# Patient Record
Sex: FEMALE | Race: WHITE | NOT HISPANIC OR LATINO | URBAN - METROPOLITAN AREA
[De-identification: names, ages, dates, MRNs, and addresses within clinical notes are randomized per-mention and may not be internally consistent; named-entity substitution may affect disease eponyms.]

---

## 2024-03-08 ENCOUNTER — OUTPATIENT (OUTPATIENT)
Dept: OUTPATIENT SERVICES | Facility: HOSPITAL | Age: 49
LOS: 1 days | End: 2024-03-08
Payer: COMMERCIAL

## 2024-03-08 VITALS
OXYGEN SATURATION: 99 % | HEIGHT: 69 IN | HEART RATE: 80 BPM | DIASTOLIC BLOOD PRESSURE: 87 MMHG | WEIGHT: 231.04 LBS | TEMPERATURE: 98 F | SYSTOLIC BLOOD PRESSURE: 137 MMHG | RESPIRATION RATE: 16 BRPM

## 2024-03-08 DIAGNOSIS — N20.1 CALCULUS OF URETER: ICD-10-CM

## 2024-03-08 DIAGNOSIS — Z01.818 ENCOUNTER FOR OTHER PREPROCEDURAL EXAMINATION: ICD-10-CM

## 2024-03-08 LAB
ANION GAP SERPL CALC-SCNC: 9 MMOL/L — SIGNIFICANT CHANGE UP (ref 5–17)
BUN SERPL-MCNC: 14 MG/DL — SIGNIFICANT CHANGE UP (ref 7–23)
CALCIUM SERPL-MCNC: 8.9 MG/DL — SIGNIFICANT CHANGE UP (ref 8.4–10.5)
CHLORIDE SERPL-SCNC: 103 MMOL/L — SIGNIFICANT CHANGE UP (ref 96–108)
CO2 SERPL-SCNC: 25 MMOL/L — SIGNIFICANT CHANGE UP (ref 22–31)
CREAT SERPL-MCNC: 0.81 MG/DL — SIGNIFICANT CHANGE UP (ref 0.5–1.3)
EGFR: 90 ML/MIN/1.73M2 — SIGNIFICANT CHANGE UP
GLUCOSE SERPL-MCNC: 91 MG/DL — SIGNIFICANT CHANGE UP (ref 70–99)
HCT VFR BLD CALC: 41.5 % — SIGNIFICANT CHANGE UP (ref 34.5–45)
HGB BLD-MCNC: 13.3 G/DL — SIGNIFICANT CHANGE UP (ref 11.5–15.5)
MCHC RBC-ENTMCNC: 29.8 PG — SIGNIFICANT CHANGE UP (ref 27–34)
MCHC RBC-ENTMCNC: 32 GM/DL — SIGNIFICANT CHANGE UP (ref 32–36)
MCV RBC AUTO: 93 FL — SIGNIFICANT CHANGE UP (ref 80–100)
NRBC # BLD: 0 /100 WBCS — SIGNIFICANT CHANGE UP (ref 0–0)
PLATELET # BLD AUTO: 251 K/UL — SIGNIFICANT CHANGE UP (ref 150–400)
POTASSIUM SERPL-MCNC: 4.3 MMOL/L — SIGNIFICANT CHANGE UP (ref 3.5–5.3)
POTASSIUM SERPL-SCNC: 4.3 MMOL/L — SIGNIFICANT CHANGE UP (ref 3.5–5.3)
RBC # BLD: 4.46 M/UL — SIGNIFICANT CHANGE UP (ref 3.8–5.2)
RBC # FLD: 13 % — SIGNIFICANT CHANGE UP (ref 10.3–14.5)
SODIUM SERPL-SCNC: 137 MMOL/L — SIGNIFICANT CHANGE UP (ref 135–145)
WBC # BLD: 6.48 K/UL — SIGNIFICANT CHANGE UP (ref 3.8–10.5)
WBC # FLD AUTO: 6.48 K/UL — SIGNIFICANT CHANGE UP (ref 3.8–10.5)

## 2024-03-08 NOTE — H&P PST ADULT - PRIMARY CARE PROVIDER
[facility]bry Pregnancy And Lactation Text: It is unknown if this medication will adversely affect pregnancy or breast feeding. Dr. Vasu uDarte

## 2024-03-08 NOTE — H&P PST ADULT - NSICDXPASTMEDICALHX_GEN_ALL_CORE_FT
PAST MEDICAL HISTORY:  Calculus of ureter     Fetal demise 20 weeks gestation    Listeria food poisoning 2005    Miscarriage 2001    Protein in urine 24 hour urine Feb 2015     PAST MEDICAL HISTORY:  Calculus of ureter     COVID-19 virus infection     Fetal demise 20 weeks gestation    Listeria food poisoning 2005    Miscarriage 2001    Protein in urine 24 hour urine Feb 2015

## 2024-03-08 NOTE — H&P PST ADULT - HISTORY OF PRESENT ILLNESS
49 yo female, PMH 47 yo female, PMH  presents to [facility] [place] for scheduled Cystoscopy, Left Ureteroscopy, Soltive Laser, Stent Insertion with Fluoroscopy, Possible Ureterocele Incision on 3/26/24. Denies recent fever, chills, chest pain, SOB, changes in bowel/urinary habits or recent exposure to COVID-19 infection.   cyst in the bladder,  h/o kidney   10 years ago, 12 years ago, lithotripsy,   sonogra,  back pain  covid 1/9/24 -  47 yo female, PMH bladder cyst (benign), left duplex kidney, nephrolithiasis s/p lithotripsy 2012 and 2014. Pt. reports having mild back pain recently, sonogram revealed left ureteral stone and presents to [facility] for scheduled Cystoscopy, Left Ureteroscopy, Soltive Laser, Stent Insertion with Fluoroscopy, Possible Ureterocele Incision on 3/26/24. Denies recent fever, chills, chest pain, SOB, changes in bowel/urinary habits or recent exposure to COVID-19 infection. (pt had Covid-19 infection in January 2024 with mild-mod symptoms - no treatment)

## 2024-03-08 NOTE — H&P PST ADULT - NEGATIVE GENERAL GENITOURINARY SYMPTOMS
no hematuria/no bladder infections/no dysuria/normal urinary frequency no hematuria/no bladder infections/no dysuria mild low back pain/no hematuria/no bladder infections/no dysuria

## 2024-03-08 NOTE — H&P PST ADULT - MUSCULOSKELETAL
no calf tenderness/normal gait/strength 5/5 bilateral upper extremities/strength 5/5 bilateral lower extremities details…

## 2024-03-08 NOTE — H&P PST ADULT - ASSESSMENT
Activity:    DASI scale:    Mallampati:    Dentition: Activity: Can walk >5 blocks, 2 flights of stairs, all house chores    DASI scale:     Mallampati: 2    Dentition: Denies loose teeth, dentures, sees dentist yearly

## 2024-03-08 NOTE — H&P PST ADULT - PROBLEM SELECTOR PLAN 1
Cystoscopy, Left Ureteroscopy, Soltive Laser, Stent Insertion with Fluoroscopy, Possible Ureterocele Incision on 3/26/24. Cystoscopy, Left Ureteroscopy, Soltive Laser, Stent Insertion with Fluoroscopy, Possible Ureterocele Incision on 3/26/24.  Pre-op instructions provided - all questions answered  Labs done at [facility]  [facility]

## 2024-03-08 NOTE — H&P PST ADULT - NSANTHOSAYNRD_GEN_A_CORE
No. CRISTAL screening performed.  STOP BANG Legend: 0-2 = LOW Risk; 3-4 = INTERMEDIATE Risk; 5-8 = HIGH Risk

## 2024-03-08 NOTE — H&P PST ADULT - NSICDXPASTSURGICALHX_GEN_ALL_CORE_FT
PAST SURGICAL HISTORY:  History of  section twin pregnancy fetal demise 25 weeks gestation    History of miscarriage   [facility]

## 2024-03-08 NOTE — H&P PST ADULT - ATTENDING COMMENTS
Female with partially duplicated left collecting system with distal ureteral stone in upper pole moiety ureter, stone in bladder/UVJ/ureterocele. OR today for left ureteroscopy, laser lithotripsy, retrograde pyelogram, possible ureterocele incision, stent(s) insertion. R/B/A reviewed. Imaging reviewed. Consent reviewed and signed. Marked on left.    Kyle Cazares MD  [facility]  [address] [facility], [place] [address]  Office: [phone] Fax: [phone]

## 2024-03-09 LAB
CULTURE RESULTS: SIGNIFICANT CHANGE UP
SPECIMEN SOURCE: SIGNIFICANT CHANGE UP

## 2024-03-25 ENCOUNTER — TRANSCRIPTION ENCOUNTER (OUTPATIENT)
Age: 49
End: 2024-03-25

## 2024-03-26 ENCOUNTER — TRANSCRIPTION ENCOUNTER (OUTPATIENT)
Age: 49
End: 2024-03-26

## 2024-03-26 ENCOUNTER — OUTPATIENT (OUTPATIENT)
Dept: INPATIENT UNIT | Facility: HOSPITAL | Age: 49
LOS: 1 days | End: 2024-03-26
Payer: COMMERCIAL

## 2024-03-26 VITALS
DIASTOLIC BLOOD PRESSURE: 71 MMHG | RESPIRATION RATE: 15 BRPM | HEART RATE: 74 BPM | OXYGEN SATURATION: 99 % | TEMPERATURE: 98 F | SYSTOLIC BLOOD PRESSURE: 115 MMHG

## 2024-03-26 VITALS
RESPIRATION RATE: 16 BRPM | DIASTOLIC BLOOD PRESSURE: 80 MMHG | SYSTOLIC BLOOD PRESSURE: 131 MMHG | TEMPERATURE: 99 F | HEART RATE: 88 BPM | OXYGEN SATURATION: 96 %

## 2024-03-26 DIAGNOSIS — N20.1 CALCULUS OF URETER: ICD-10-CM

## 2024-03-26 LAB — HCG UR QL: NEGATIVE — SIGNIFICANT CHANGE UP

## 2024-03-26 PROCEDURE — C2617: CPT

## 2024-03-26 PROCEDURE — C1758: CPT

## 2024-03-26 PROCEDURE — 87086 URINE CULTURE/COLONY COUNT: CPT

## 2024-03-26 PROCEDURE — 80048 BASIC METABOLIC PNL TOTAL CA: CPT

## 2024-03-26 PROCEDURE — G0463: CPT

## 2024-03-26 PROCEDURE — 52332 CYSTOSCOPY AND TREATMENT: CPT | Mod: LT

## 2024-03-26 PROCEDURE — C1769: CPT

## 2024-03-26 PROCEDURE — 81025 URINE PREGNANCY TEST: CPT

## 2024-03-26 PROCEDURE — 76000 FLUOROSCOPY <1 HR PHYS/QHP: CPT

## 2024-03-26 PROCEDURE — 85027 COMPLETE CBC AUTOMATED: CPT

## 2024-03-26 DEVICE — STENT URET INLAY OPTIMA 6FRX26CM: Type: IMPLANTABLE DEVICE | Site: LEFT | Status: FUNCTIONAL

## 2024-03-26 DEVICE — GUIDEWIRE SENSOR DUAL-FLEX NITINOL STRAIGHT .035" X 150CM: Type: IMPLANTABLE DEVICE | Site: LEFT | Status: FUNCTIONAL

## 2024-03-26 DEVICE — URETERAL CATH OPEN END 5FR 70CM: Type: IMPLANTABLE DEVICE | Site: LEFT | Status: FUNCTIONAL

## 2024-03-26 RX ORDER — PHENAZOPYRIDINE HCL 100 MG
200 TABLET ORAL ONCE
Refills: 0 | Status: COMPLETED | OUTPATIENT
Start: 2024-03-26 | End: 2024-03-26

## 2024-03-26 RX ORDER — ONDANSETRON 8 MG/1
4 TABLET, FILM COATED ORAL ONCE
Refills: 0 | Status: DISCONTINUED | OUTPATIENT
Start: 2024-03-26 | End: 2024-03-26

## 2024-03-26 RX ORDER — HYDROMORPHONE HYDROCHLORIDE 2 MG/ML
0.5 INJECTION INTRAMUSCULAR; INTRAVENOUS; SUBCUTANEOUS
Refills: 0 | Status: DISCONTINUED | OUTPATIENT
Start: 2024-03-26 | End: 2024-03-26

## 2024-03-26 RX ORDER — OXYBUTYNIN CHLORIDE 5 MG
5 TABLET ORAL ONCE
Refills: 0 | Status: COMPLETED | OUTPATIENT
Start: 2024-03-26 | End: 2024-03-26

## 2024-03-26 RX ORDER — KETOROLAC TROMETHAMINE 30 MG/ML
15 SYRINGE (ML) INJECTION ONCE
Refills: 0 | Status: DISCONTINUED | OUTPATIENT
Start: 2024-03-26 | End: 2024-03-26

## 2024-03-26 RX ORDER — SODIUM CHLORIDE 9 MG/ML
1000 INJECTION, SOLUTION INTRAVENOUS
Refills: 0 | Status: DISCONTINUED | OUTPATIENT
Start: 2024-03-26 | End: 2024-03-26

## 2024-03-26 RX ORDER — LIDOCAINE HCL 20 MG/ML
0.2 VIAL (ML) INJECTION ONCE
Refills: 0 | Status: DISCONTINUED | OUTPATIENT
Start: 2024-03-26 | End: 2024-03-26

## 2024-03-26 RX ORDER — CEFAZOLIN SODIUM 1 G
2000 VIAL (EA) INJECTION ONCE
Refills: 0 | Status: COMPLETED | OUTPATIENT
Start: 2024-03-26 | End: 2024-03-26

## 2024-03-26 RX ORDER — SODIUM CHLORIDE 9 MG/ML
3 INJECTION INTRAMUSCULAR; INTRAVENOUS; SUBCUTANEOUS EVERY 8 HOURS
Refills: 0 | Status: DISCONTINUED | OUTPATIENT
Start: 2024-03-26 | End: 2024-03-26

## 2024-03-26 RX ADMIN — Medication 5 MILLIGRAM(S): at 15:04

## 2024-03-26 RX ADMIN — Medication 200 MILLIGRAM(S): at 15:04

## 2024-03-26 RX ADMIN — Medication 15 MILLIGRAM(S): at 15:04

## 2024-03-26 NOTE — ASU DISCHARGE PLAN (ADULT/PEDIATRIC) - NS MD DC FALL RISK RISK
For information on Fall & Injury Prevention, visit: [email or web address] OR  [email or web address] OR  https://www.cdc.gov/steadi/patient.html

## 2024-03-26 NOTE — PRE-ANESTHESIA EVALUATION ADULT - NSANTHSNORERD_ENT_A_CORE
Yes Diet, Regular:   1500mL Fluid Restriction ([id number]) (04-15-23)   Diet, Regular:   1500mL Fluid Restriction ([id number]) (04-15-23)   Diet, Regular:   1500mL Fluid Restriction ([id number]) (04-15-23)

## 2024-03-26 NOTE — BRIEF OPERATIVE NOTE - OPERATION/FINDINGS
lower pole moiety ureter easily accessed and orifice in orthotopic position - upper pole moiety ureter emptied into ureterocele - access unable to be obtained

## 2024-03-26 NOTE — ASU DISCHARGE PLAN (ADULT/PEDIATRIC) - CARE PROVIDER_API CALL
peritoneal fluid Kyle Cazares  Urology  [address], [address]  [place] [address]  Phone: [phone]  Fax: [phone]  Established Patient  Follow Up Time: 1 week

## 2024-03-26 NOTE — ASU PATIENT PROFILE, ADULT - FALL HARM RISK - CONCLUSION
Pt having occasional pauses. Longest one has been 3.08 seconds. Dr. Cintia Tena notfied via perfect serve. Pt's VSS and she is asymptomatic. Cardiology consulted call at [phone] but team has not seen patient yet. Per Dr. Cintia Tena continue to monitor overnight, but if pauses become greater than 5 seconds call cardiology and notify. Universal Safety Interventions

## 2024-03-26 NOTE — BRIEF OPERATIVE NOTE - NSICDXBRIEFPOSTOP_GEN_ALL_CORE_FT
POST-OP DIAGNOSIS:  Left ureteral stone 26-Mar-2024 13:30:39  Kyle Cazares  Duplicated left renal collecting system 26-Mar-2024 13:30:48  Kyle Cazares  Ureterocele 26-Mar-2024 13:31:00  Kyle Cazares

## 2024-03-26 NOTE — BRIEF OPERATIVE NOTE - NSICDXBRIEFPROCEDURE_GEN_ALL_CORE_FT
PROCEDURES:  Cystoscopy with left retrograde pyelography with ureteroscopy with insertion of ureteral stent 26-Mar-2024 13:29:35  Kyle Cazares

## 2024-03-26 NOTE — ASU DISCHARGE PLAN (ADULT/PEDIATRIC) - ASU DC SPECIAL INSTRUCTIONSFT
You have been sent three days of antibiotics to begin tonight.  I will contact you regarding next steps.

## 2024-03-26 NOTE — ASU DISCHARGE PLAN (ADULT/PEDIATRIC) - NURSING INSTRUCTIONS
azo over the counter for burning when urinating azo over the counter for burning when urinating  antibiotics were sent from DR medina office, please call office if you did not receive them

## 2024-04-04 PROBLEM — U07.1 COVID-19: Chronic | Status: ACTIVE | Noted: 2024-03-08

## 2024-04-08 PROBLEM — R93.89 ABNORMAL FINDING ON IMAGING: Status: ACTIVE | Noted: 2024-04-08

## 2024-04-08 PROBLEM — N20.0 CALCULUS OF LEFT KIDNEY: Status: ACTIVE | Noted: 2024-04-08

## 2024-04-08 PROBLEM — K80.20 CALCULUS OF GALLBLADDER WITHOUT CHOLECYSTITIS WITHOUT OBSTRUCTION: Status: ACTIVE | Noted: 2024-04-08

## 2024-04-08 PROBLEM — F41.9 ANXIETY: Status: ACTIVE | Noted: 2024-04-08

## 2024-04-09 ENCOUNTER — APPOINTMENT (OUTPATIENT)
Dept: INTERVENTIONAL RADIOLOGY/VASCULAR | Facility: CLINIC | Age: 49
End: 2024-04-09
Payer: COMMERCIAL

## 2024-04-09 DIAGNOSIS — Z01.818 ENCOUNTER FOR OTHER PREPROCEDURAL EXAMINATION: ICD-10-CM

## 2024-04-09 DIAGNOSIS — K80.20 CALCULUS OF GALLBLADDER W/OUT CHOLECYSTITIS W/OUT OBSTRUCTION: ICD-10-CM

## 2024-04-09 DIAGNOSIS — R93.89 ABNORMAL FINDINGS ON DIAGNOSTIC IMAGING OF OTHER SPECIFIED BODY STRUCTURES: ICD-10-CM

## 2024-04-09 DIAGNOSIS — Q63.8 OTHER SPECIFIED CONGENITAL MALFORMATIONS OF KIDNEY: ICD-10-CM

## 2024-04-09 DIAGNOSIS — R01.1 CARDIAC MURMUR, UNSPECIFIED: ICD-10-CM

## 2024-04-09 DIAGNOSIS — Z80.3 FAMILY HISTORY OF MALIGNANT NEOPLASM OF BREAST: ICD-10-CM

## 2024-04-09 DIAGNOSIS — F41.9 ANXIETY DISORDER, UNSPECIFIED: ICD-10-CM

## 2024-04-09 DIAGNOSIS — N20.0 CALCULUS OF KIDNEY: ICD-10-CM

## 2024-04-09 DIAGNOSIS — Z78.9 OTHER SPECIFIED HEALTH STATUS: ICD-10-CM

## 2024-04-09 PROCEDURE — 99203 OFFICE O/P NEW LOW 30 MIN: CPT

## 2024-04-17 PROBLEM — N20.1 CALCULUS OF URETER: Chronic | Status: ACTIVE | Noted: 2024-03-08

## 2024-04-24 ENCOUNTER — OUTPATIENT (OUTPATIENT)
Dept: OUTPATIENT SERVICES | Facility: HOSPITAL | Age: 49
LOS: 1 days | End: 2024-04-24
Payer: COMMERCIAL

## 2024-04-24 VITALS
SYSTOLIC BLOOD PRESSURE: 120 MMHG | DIASTOLIC BLOOD PRESSURE: 80 MMHG | TEMPERATURE: 98 F | WEIGHT: 229.94 LBS | HEART RATE: 68 BPM | RESPIRATION RATE: 18 BRPM | OXYGEN SATURATION: 98 % | HEIGHT: 69 IN

## 2024-04-24 DIAGNOSIS — N20.1 CALCULUS OF URETER: ICD-10-CM

## 2024-04-24 DIAGNOSIS — Z01.818 ENCOUNTER FOR OTHER PREPROCEDURAL EXAMINATION: ICD-10-CM

## 2024-04-24 LAB
ANION GAP SERPL CALC-SCNC: 12 MMOL/L — SIGNIFICANT CHANGE UP (ref 5–17)
BUN SERPL-MCNC: 10 MG/DL — SIGNIFICANT CHANGE UP (ref 7–23)
CALCIUM SERPL-MCNC: 9 MG/DL — SIGNIFICANT CHANGE UP (ref 8.4–10.5)
CHLORIDE SERPL-SCNC: 104 MMOL/L — SIGNIFICANT CHANGE UP (ref 96–108)
CO2 SERPL-SCNC: 24 MMOL/L — SIGNIFICANT CHANGE UP (ref 22–31)
CREAT SERPL-MCNC: 0.78 MG/DL — SIGNIFICANT CHANGE UP (ref 0.5–1.3)
EGFR: 94 ML/MIN/1.73M2 — SIGNIFICANT CHANGE UP
GLUCOSE SERPL-MCNC: 90 MG/DL — SIGNIFICANT CHANGE UP (ref 70–99)
HCT VFR BLD CALC: 39.9 % — SIGNIFICANT CHANGE UP (ref 34.5–45)
HGB BLD-MCNC: 13.1 G/DL — SIGNIFICANT CHANGE UP (ref 11.5–15.5)
MCHC RBC-ENTMCNC: 30.2 PG — SIGNIFICANT CHANGE UP (ref 27–34)
MCHC RBC-ENTMCNC: 32.8 GM/DL — SIGNIFICANT CHANGE UP (ref 32–36)
MCV RBC AUTO: 91.9 FL — SIGNIFICANT CHANGE UP (ref 80–100)
NRBC # BLD: 0 /100 WBCS — SIGNIFICANT CHANGE UP (ref 0–0)
PLATELET # BLD AUTO: 227 K/UL — SIGNIFICANT CHANGE UP (ref 150–400)
POTASSIUM SERPL-MCNC: 4.1 MMOL/L — SIGNIFICANT CHANGE UP (ref 3.5–5.3)
POTASSIUM SERPL-SCNC: 4.1 MMOL/L — SIGNIFICANT CHANGE UP (ref 3.5–5.3)
RBC # BLD: 4.34 M/UL — SIGNIFICANT CHANGE UP (ref 3.8–5.2)
RBC # FLD: 12.9 % — SIGNIFICANT CHANGE UP (ref 10.3–14.5)
SODIUM SERPL-SCNC: 140 MMOL/L — SIGNIFICANT CHANGE UP (ref 135–145)
WBC # BLD: 7.38 K/UL — SIGNIFICANT CHANGE UP (ref 3.8–10.5)
WBC # FLD AUTO: 7.38 K/UL — SIGNIFICANT CHANGE UP (ref 3.8–10.5)

## 2024-04-24 PROCEDURE — G0463: CPT

## 2024-04-24 PROCEDURE — 80048 BASIC METABOLIC PNL TOTAL CA: CPT

## 2024-04-24 PROCEDURE — 85027 COMPLETE CBC AUTOMATED: CPT

## 2024-04-24 PROCEDURE — 87086 URINE CULTURE/COLONY COUNT: CPT

## 2024-04-24 RX ORDER — ERGOCALCIFEROL 1.25 MG/1
1 CAPSULE ORAL
Refills: 0 | DISCHARGE

## 2024-04-24 NOTE — H&P PST ADULT - HISTORY OF PRESENT ILLNESS
47 yo female with PMHx significant for Bladder cyst (benign), left duplex kidney, nephrolithiasis, s/p lithotripsy 2012 and 2014, s/p Cystoscopy with left retrograde pyelography with ureteroscopy with insertion of ureteral stent (3/26/24) who reports intermittent left flank pain, nocturia, and increased urinary frequency. She is now scheduled for Cystoscopy, LEFT Ureteroscopy, Soltive Laser, LEFT Stent Exchange, Transurethral Incision of Ureterocele w/ Bipolar on 5/6/24. She denies CP, SOB, palps, fever, chills.  47 y/o pleasant female with PMHx significant for Bladder cyst (benign), left duplex kidney, nephrolithiasis, s/p lithotripsy 2012 and 2014, s/p Cystoscopy with left retrograde pyelography with ureteroscopy with insertion of ureteral stent (3/26/24) who reports intermittent left flank pain, nocturia, and increased urinary frequency. She is now scheduled for Cystoscopy, LEFT Ureteroscopy, Soltive Laser, LEFT Stent Exchange, Transurethral Incision of Ureterocele w/ Bipolar on 5/6/24. She denies CP, SOB, palps, fever, chills.

## 2024-04-24 NOTE — H&P PST ADULT - ASSESSMENT
DASI score: 6.7 mets   DASI activity: works as school nurse, gardening, carry groceries  Loose teeth or denture: no     MP 1

## 2024-04-24 NOTE — H&P PST ADULT - NSICDXPASTSURGICALHX_GEN_ALL_CORE_FT
PAST SURGICAL HISTORY:  History of  section twin pregnancy fetal demise 25 weeks gestation    History of miscarriage   [facility]

## 2024-04-24 NOTE — H&P PST ADULT - NSICDXPASTMEDICALHX_GEN_ALL_CORE_FT
PAST MEDICAL HISTORY:  Calculus of ureter     COVID-19 virus infection     Fetal demise 20 weeks gestation    Listeria food poisoning 2005    Miscarriage 2001    Protein in urine 24 hour urine Feb 2015

## 2024-04-24 NOTE — H&P PST ADULT - GENERAL
Per chart, patient has not had influenza vaccine this season.    Called and updated that patient has not received flu vaccine this season.    Per assisted living, patient needs flu vaccine.    Scheduled nurse-only visit. No further questions or concerns at this time.    Future Appointments   Date Time Provider Department Center   1/25/2021  2:30 PM [facility] IM NURSE SASHA 230 [facility]   3/11/2021  8:30 AM [facility] ACL LAB [facility]   3/18/2021  3:30 PM Socorro Tovar MD [facility]      negative

## 2024-04-24 NOTE — H&P PST ADULT - PROBLEM SELECTOR PLAN 1
Cystoscopy, LEFT Ureteroscopy, Soltive Laser, LEFT Stent Exchange, Transurethral Incision of Ureterocele w/ Bipolar on 5/6/24.

## 2024-04-24 NOTE — H&P PST ADULT - ATTENDING COMMENTS
Left upper pole moiety distal ureteral stone, left ureterocele with stone in it, s/p left lower pole moiety ureteral stent insertion, left upper pole moiety nephroureteral tube with curl in ureterocele.  OR today for transurethral resection of left ureterocele, left ureteroscopy, laser lithotripsy, stent removal, nephroureteral tube removal, stent insertion. R/B/A reviewed. Imaging reviewed. Consent reviewed and signed. Marked on left.    Kyle Cazares MD  [facility]  [address], [facility] [address], [place] [address]  Office: [phone] Fax: [phone]

## 2024-04-25 LAB
CULTURE RESULTS: SIGNIFICANT CHANGE UP
SPECIMEN SOURCE: SIGNIFICANT CHANGE UP

## 2024-05-01 NOTE — HISTORY OF PRESENT ILLNESS
[FreeTextEntry1] : This is a 49 y/o female with hx of heart murmur, anxiety, bladder cyst (benign), asymptomatic cholelithiasis, left duplex kidney, multiple left renal calculi demonstrated on 2/8/24 CT urogram, who presents to IR for left nephroureteral catheter placement consultation, as referred by Dr Cazares prior to planned renal stone removal.  Ms Toussaint reports she has a hx of kidney stones and had shock wave lithotripsy about 9-10 years ago. She reports she recently  developed cloudy urine, frequency and pain on urination which prompted visit to urologist. She had imaging completed which revealed an obstructing left renal stone. She had a cystoscopy/ureteroscopy on 3/26 with urology however access to the ureteral orifice to the upper pole moiety ureter was unsuccessful. She is now referred to IR by Dr. Cazares for left left nephroureteral catheter placement prior to her planned urological surgery.  Today she reports feeling well. Still with cloudy urine, frequency and pain on urination. Denies any fevers, hematuria or flank pain.   Of note pt reports has a heart murmur and was previously told it was exacerbated by anesthesia. Being monitored by pcp. She has appt with pcp tomorrow 4/10 and will discuss clearance.  Denies nsaids or a/c usage.  ureteroscopy and transurethral resection or incision of the ureterocele on 4/15      Surg: Dinorah Cazares  Dr Bai PCP Dr Vasu Escalante [phone]  dinorah cazares

## 2024-05-01 NOTE — REASON FOR VISIT
[Consultation] : a consultation visit [Home] : at home, [unfilled] , at the time of the visit. [Other Location: e.g. Home (Enter Location, City,State)___] : at [unfilled] [Patient] : the patient [Self] : self [FreeTextEntry1] : left nephroureteral catheter placement

## 2024-05-01 NOTE — ADDENDUM
[FreeTextEntry1] : 5/1/24- received & reviewed medical clearance from Dr Duarte & cardiac clearance from Dr Ritter along with cardiac tests. Tasked [facility] staff to place clearances & cardiac testing on pt's procedure chart JOSH BLANC  4/10/24:  - 4/12 procedure cxd as pt requires cardiac clearance before medical clearance per d/w Dr Escalante - his office notifying her  - spoke to pt re above - she has cardiac eval 4/17 @ 9:30 am - Dr name unknown - pst to be sched after 4/17 - Dr Cazares apprised & has cxd  sx on 4/15 until pt is cleared - [facility] office apprised of the above & SS order has been updated  Ms. MICHELE's comprehension was confirmed & all questions were asked & answered to her satisfaction. IR contact information was reviewed with the pt should there be any questions, issues, or concerns, to be addressed. JOSH BLANC

## 2024-05-01 NOTE — ASSESSMENT
[Other: _____] : [unfilled] [FreeTextEntry1] : This is a 49 y/o female with hx of heart murmur, anxiety, bladder cyst (benign), asymptomatic cholelithiasis, left duplex kidney, multiple left renal calculi demonstrated on 2/8/24 CT urogram, who presents to IR for left nephroureteral catheter placement consultation, as referred by Dr Cazares prior to planned renal stone removal.  1. Left UVJ Calculi - pt with hx of left duplex kidney and nephrolithiasis s/p lithotripsy 9-10 years ago - recently with cloudy urine, dysuria and frequency - demonstrated on 2/8/24 CT urogram with bifid left ureter which join to form a single ureter at the level of UVJ. Left UVJ calculus measuring 6mm; additional 4mm calculus at the left distal ureter - imaging was reviewed at great length with Ms. MICHELE - referred by Dr Bai for image guided left nephroureteral catheter placement - procedural benefits, alternatives, risks (infection, bleeding, MARGO etc) & expected postprocedure course were reviewed, as well - home care for catheter management to be arranged by referring Dr Bai - pt reports had PST and labwork done at [facility] but does not have contact to obtain-is going to her pcp tomorrow 4/10 and will obtain labs/pst visit to fax over - advised pt should she be unable to obtain labs, i have order CBC, BMP, PT/INR and she can go to any [facility] lab tomorrow 4/10 to obtain labs given her procedure is scheduled for 4/12 - will be admitted overnight - continue with consistent follow ups with Dr. Cazares and Dr. Bai  2. Heart murmur -pt reports hx of heart murmur unsure of which kind - reports is asymptomatic however was previously told it was exacerbated by anesthesia - monitored by her pcp - has visit to pcp tomorrow 4/10 for clearance-advised pt to have pcp fax clearance to IR for procedure  I have provided the patient the opportunity to ask questions and have answered them to their satisfaction.  They are encouraged to contact our office with any further questions, concerns, or issues.

## 2024-05-08 ENCOUNTER — TRANSCRIPTION ENCOUNTER (OUTPATIENT)
Age: 49
End: 2024-05-08

## 2024-05-08 ENCOUNTER — INPATIENT (INPATIENT)
Facility: HOSPITAL | Age: 49
LOS: 0 days | Discharge: HOME HEALTH | DRG: 307 | End: 2024-05-09
Attending: STUDENT IN AN ORGANIZED HEALTH CARE EDUCATION/TRAINING PROGRAM | Admitting: RADIOLOGY
Payer: COMMERCIAL

## 2024-05-08 VITALS
WEIGHT: 240.08 LBS | SYSTOLIC BLOOD PRESSURE: 125 MMHG | DIASTOLIC BLOOD PRESSURE: 73 MMHG | HEIGHT: 69 IN | HEART RATE: 71 BPM | OXYGEN SATURATION: 95 % | RESPIRATION RATE: 17 BRPM | TEMPERATURE: 99 F

## 2024-05-08 DIAGNOSIS — N20.0 CALCULUS OF KIDNEY: ICD-10-CM

## 2024-05-08 DIAGNOSIS — R01.0 BENIGN AND INNOCENT CARDIAC MURMURS: ICD-10-CM

## 2024-05-08 LAB
APTT BLD: 38.2 SEC — HIGH (ref 24.5–35.6)
INR BLD: 1.05 RATIO — SIGNIFICANT CHANGE UP (ref 0.85–1.18)
PROTHROM AB SERPL-ACNC: 11 SEC — SIGNIFICANT CHANGE UP (ref 9.5–13)

## 2024-05-08 PROCEDURE — 99222 1ST HOSP IP/OBS MODERATE 55: CPT

## 2024-05-08 PROCEDURE — 50433 PLMT NEPHROURETERAL CATHETER: CPT | Mod: LT

## 2024-05-08 RX ORDER — INFLUENZA VIRUS VACCINE 15; 15; 15; 15 UG/.5ML; UG/.5ML; UG/.5ML; UG/.5ML
0.5 SUSPENSION INTRAMUSCULAR ONCE
Refills: 0 | Status: DISCONTINUED | OUTPATIENT
Start: 2024-05-08 | End: 2024-05-09

## 2024-05-08 RX ORDER — OXYCODONE HYDROCHLORIDE 5 MG/1
5 TABLET ORAL ONCE
Refills: 0 | Status: DISCONTINUED | OUTPATIENT
Start: 2024-05-08 | End: 2024-05-09

## 2024-05-08 RX ORDER — HYDROMORPHONE HYDROCHLORIDE 2 MG/ML
0.25 INJECTION INTRAMUSCULAR; INTRAVENOUS; SUBCUTANEOUS
Refills: 0 | Status: DISCONTINUED | OUTPATIENT
Start: 2024-05-08 | End: 2024-05-09

## 2024-05-08 RX ORDER — ACETAMINOPHEN 500 MG
650 TABLET ORAL EVERY 6 HOURS
Refills: 0 | Status: DISCONTINUED | OUTPATIENT
Start: 2024-05-08 | End: 2024-05-09

## 2024-05-08 RX ORDER — ONDANSETRON 8 MG/1
4 TABLET, FILM COATED ORAL ONCE
Refills: 0 | Status: DISCONTINUED | OUTPATIENT
Start: 2024-05-08 | End: 2024-05-09

## 2024-05-08 NOTE — PATIENT PROFILE ADULT - MEDICATIONS/VISITS
Pre-Operative Progress Note


H&P Reviewed


The H&P was reviewed, patient examined and no changes noted.


Date H&P Reviewed:  Sep 25, 2020


Time H&P Reviewed:  07:56





Conscious Sedation Pre-Proced


ASA Score


2


For ASA 3 and 4: Consider anesthesia and medical clearance. Also, for patients

with a history of failed moderate sedation consider anesthesia.

















Airway 


 


Lungs 


 


Heart 


 


 ASA score


 


 ASA 1: a normal healthy patient


 


 ASA 2:  a patient with a mild systemic disease (mid diabetes, controlled 

hypertension, obesity 


 


 ASA 3:  a patient with a severe systemic disease that limits activity  (angina,

COPD, prior Myocardial infarction)


 


 ASA 4:  a patient with an incapacitating disease that is a constant threat to 

life (CHF, renal failure)


 


 ASA 5:  a moribund patient not expected to survive 24 hrs.  (ruptured aneurysm)


 


 ASA 6:  a declared brain-dead patient whose organs are being harvested.


 


 For emergent operations, add the letter E after the classification











Mallampati Classification


Grade 2





Sedation Plan


Analgesia, Amnesia, Plan communicated to team members, Discussed options with 

patient/fam, Discussed risks with patient/fam


The patient is an appropriate candidate to undergo the planned procedure, 

sedation, and anesthesia.





The patient immediately re-assessed prior to indication.











NEELA RUANO MD              Sep 25, 2020 07:56 no

## 2024-05-08 NOTE — H&P ADULT - NSICDXPASTSURGICALHX_GEN_ALL_CORE_FT
PAST SURGICAL HISTORY:  History of  section twin pregnancy fetal demise 25 weeks gestation    History of miscarriage   [facility]

## 2024-05-08 NOTE — DISCHARGE NOTE PROVIDER - PROVIDER TOKENS
PROVIDER:[TOKEN:[[id number]],SCHEDULEDAPPT:[05/13/2024]] PROVIDER:[TOKEN:[[id number]],SCHEDULEDAPPT:[05/13/2024]],PROVIDER:[TOKEN:[[id number]]]

## 2024-05-08 NOTE — PROCEDURE NOTE - PROCEDURE FINDINGS AND DETAILS
access into left upper pole moiety    antegrade ureterogram demonstrates dilated ureterocele at UVJ of left upper pole moiety, clearance via diminuitive UVJ into bladder on delay images, unable to cross to bladder with wire.     8.5 f x 28cm nephroureteral tube distal pigtail within the ureterocele, proximal pigtail unformed, sidehole marker at the level of the proximal ureter.

## 2024-05-08 NOTE — PROCEDURE NOTE - PLAN
bedrest 1h then floor,     admit to medicine overnight for monitoring  followup with urology for further stone/ureterocele management

## 2024-05-08 NOTE — H&P ADULT - PROBLEM SELECTOR PLAN 1
- Patient s/p nephroureteral tube by IR in anticipation for possible urethrocele and nephrolithiasis intervention by urology  - Monitor on medicine overnight, no further IR intervention  - Patient has appointment for intervention with Dr. Duran (urology) on 5/13

## 2024-05-08 NOTE — H&P ADULT - ASSESSMENT
48F with PMHx of nephrolithiasis was initially planned for cystoscopy and left urethroscopy with possible incision of known ureterocele and nephrolithotomy by urology, but due to duplicate left renal collecting system had to present to IR first for removal of original stent and placement of new stent into the other renal collecting system. Patient s/p left nephroureteral tube placement within the upper pole moiety. Patient admitted to medicine to monitor overnight.

## 2024-05-08 NOTE — DISCHARGE NOTE PROVIDER - NSDCFUADDAPPT_GEN_ALL_CORE_FT
Follow up with Dr. Duran (urology) on 5/13/24 as an outpatient.  Follow up with Dr. Duran (urology) on 5/13/24 as an outpatient.     Regarding outpatient drainage follow up with IR, if the pt is d/c home with drainage catheter then they can make an appointment with IR by calling IR booking office at [phone]. Recommend follow up with IR for nephrostomy tube check in 3 months after initial placement of drain.      You had a Left Nephroureteral Drain placed by Dr. Ernst on 5/8 in Interventional Radiology (IR).     Monitor site for any sign or symptoms of infection (painful red skin, green/ yellow foul smelling discharge from the insertion site, fever, chills, leakage around drain site).     Flush drain with 10mL NS daily. If you meet resistance upon flushing, STOP and contact IR.     Empty drainage bag or bulb daily and record output. Once output is <10mL in a 24hr period or if there is a sudden decrease in output please contact IR to schedule  an appointment.    Drain care:  -Disconnect tubing (tube attached to bag/ bulb)  from the catheter (catheter going into skin)  -Clean catheter with alcohol swab  -Twist on the flush syringe to the catheter (be sure to push the air out of syringe)  -Flush catheter with 10mL (DO NOT pull back on syringe). If you meet resistance upon flushing, STOP and contact IR.   -Disconnect syringe from catheter and reconnect drainage bag or bulb.    Dressing care:  -Wash hands thoroughly with water and soap for at least 20 seconds.   -take off old dressing and clean around drain site with gauze soaked with warm water  -After cleaning the site, dry and replace dressing with a new gauze and tegaderm.   -Place one piece of gauze underneath the drain and another piece of gauze on top of drain.   -Apply tegaderm (clear dressing) on top.  -Change dressing every 3 days or when soiled

## 2024-05-08 NOTE — DISCHARGE NOTE PROVIDER - NSDCCPCAREPLAN_GEN_ALL_CORE_FT
PRINCIPAL DISCHARGE DIAGNOSIS  Diagnosis: Nephrolithiasis  Assessment and Plan of Treatment: Follow up with urology outpatient     PRINCIPAL DISCHARGE DIAGNOSIS  Diagnosis: Nephrolithiasis  Assessment and Plan of Treatment: patient had placement of left nephroureteral tube on 5/9 in Interventional Radiology with Dr. Ernst.   Follow up with urology outpatient.   flush drain with 10cc NS daily forward only; DO NOT aspirate  - change dressing q3 days or when dressing is saturated

## 2024-05-08 NOTE — ASU PATIENT PROFILE, ADULT - NSICDXPASTSURGICALHX_GEN_ALL_CORE_FT
PAST SURGICAL HISTORY:  History of  section twin pregnancy fetal demise 25 weeks gestation    History of miscarriage   [facility]

## 2024-05-08 NOTE — PRE-ANESTHESIA EVALUATION ADULT - LAST CARDIAC ANGIOGRAM/IMAGING
denies No. WILNER screening performed.  STOP BANG Legend: 0-2 = LOW Risk; 3-4 = INTERMEDIATE Risk; 5-8 = HIGH Risk

## 2024-05-08 NOTE — H&P ADULT - NSHPPHYSICALEXAM_GEN_ALL_CORE
T(C): 36.4 (05-08-24 @ 13:20), Max: 37.2 (05-08-24 @ 09:10)  HR: 58 (05-08-24 @ 14:05) (56 - 71)  BP: 116/75 (05-08-24 @ 14:05) (112/70 - 125/73)  RR: 11 (05-08-24 @ 14:05) (10 - 17)  SpO2: 99% (05-08-24 @ 14:05) (95% - 99%)    GEN: female in NAD, appears comfortable, no diaphoresis  EYES: No scleral injection, PERRL, EOMI  ENTM: neck supple & symmetric without tracheal deviation, moist membranes, no gross hearing impairment, thyroid gland not enlarged  CV: +S1/S2, no m/r/g, no abdominal bruit, no LE edema  RESP: breathing comfortably, no respiratory accessory muscle use, CTAB, no w/r/r  GI: normoactive BS, soft, NTND, no rebounding/guarding, no palpable masses  LYMPHATICS: no LAD or tenderness to palpation  NEURO: AOx3, no focal deficits, CNII-XII grossly intact  PSYCH: No SI/HI/AVH, appropriate affect, appropriate insight/judgment   SKIN: no petechiae, ecchymosis or maculopapular rash noted

## 2024-05-08 NOTE — DISCHARGE NOTE PROVIDER - NSDCFUSCHEDAPPT_GEN_ALL_CORE_FT
Kyle Cazares  [facility]  [facility]  Scheduled Appointment: 05/13/2024

## 2024-05-08 NOTE — DISCHARGE NOTE PROVIDER - HOSPITAL COURSE
HPI:  48F with PMHx of nephrolithiasis was initially planned for cystoscopy and left urethroscopy with possible incision of known ureterocele and nephrolithotomy by urology, but due to duplicate left renal collecting system had to present to IR first for removal of original stent and placement of new stent into the other renal collecting system. Patient s/p left nephroureteral tube placement within the upper pole moiety.  Patient tolerated procedure well and admitted to medicine to monitor overnight. Patient without complaints when seen in IR. (08 May 2024 14:38)    Hospital Course:  Patient admitted to medicine to monitor overnight. There were no events. Patient to follow up with urology outpatient.

## 2024-05-08 NOTE — DISCHARGE NOTE PROVIDER - CARE PROVIDER_API CALL
Kyle Cazares  Urology  [address] [facility], [address]  [place] [address]  Phone: [phone]  Fax: [phone]  Scheduled Appointment: 05/13/2024   Kyle Cazares  Urology  [address] [facility], [address]  [place] [address]  Phone: [phone]  Fax: [phone]  Scheduled Appointment: 05/13/2024    Jose Ernst  Interventional Radiology and Diagnostic Radiology  [address] [place] [address]  Phone: [phone]  Fax: [phone]  Follow Up Time:

## 2024-05-08 NOTE — DISCHARGE NOTE PROVIDER - CARE PROVIDERS DIRECT ADDRESSES
[email or web address]

## 2024-05-08 NOTE — H&P ADULT - HISTORY OF PRESENT ILLNESS
48F with PMHx of nephrolithiasis was initially planned for cystoscopy and left urethroscopy with possible incision of known ureterocele and nephrolithotomy by urology, but due to duplicate left renal collecting system had to present to IR first for removal of original stent and placement of new stent into the other renal collecting system. Patient s/p left nephroureteral tube placement within the upper pole moiety.  Patient tolerated procedure well and admitted to medicine to monitor overnight. Patient without complaints when seen in IR.

## 2024-05-08 NOTE — PRE PROCEDURE NOTE - PRE PROCEDURE EVALUATION
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 Presents today for left nephroureteral tube placement within the upper pole moiety.     Allergies: No Known Allergies      -Risks/Benefits/alternatives explained with the patient and/or healthcare proxy and witnessed informed consent obtained.

## 2024-05-09 ENCOUNTER — TRANSCRIPTION ENCOUNTER (OUTPATIENT)
Age: 49
End: 2024-05-09

## 2024-05-09 VITALS
OXYGEN SATURATION: 97 % | TEMPERATURE: 99 F | DIASTOLIC BLOOD PRESSURE: 74 MMHG | RESPIRATION RATE: 18 BRPM | HEART RATE: 88 BPM | SYSTOLIC BLOOD PRESSURE: 133 MMHG

## 2024-05-09 PROCEDURE — 99238 HOSP IP/OBS DSCHRG MGMT 30/<: CPT

## 2024-05-09 PROCEDURE — 99231 SBSQ HOSP IP/OBS SF/LOW 25: CPT

## 2024-05-09 NOTE — DISCHARGE NOTE NURSING/CASE MANAGEMENT/SOCIAL WORK - NSDCFUADDAPPT_GEN_ALL_CORE_FT
Follow up with Dr. Duran (urology) on 5/13/24 as an outpatient.     Regarding outpatient drainage follow up with IR, if the pt is d/c home with drainage catheter then they can make an appointment with IR by calling IR booking office at [phone]. Recommend follow up with IR for nephrostomy tube check in 3 months after initial placement of drain.      You had a Left Nephroureteral Drain placed by Dr. Ernst on 5/8 in Interventional Radiology (IR).     Monitor site for any sign or symptoms of infection (painful red skin, green/ yellow foul smelling discharge from the insertion site, fever, chills, leakage around drain site).     Flush drain with 10mL NS daily. If you meet resistance upon flushing, STOP and contact IR.     Empty drainage bag or bulb daily and record output. Once output is <10mL in a 24hr period or if there is a sudden decrease in output please contact IR to schedule  an appointment.    Drain care:  -Disconnect tubing (tube attached to bag/ bulb)  from the catheter (catheter going into skin)  -Clean catheter with alcohol swab  -Twist on the flush syringe to the catheter (be sure to push the air out of syringe)  -Flush catheter with 10mL (DO NOT pull back on syringe). If you meet resistance upon flushing, STOP and contact IR.   -Disconnect syringe from catheter and reconnect drainage bag or bulb.    Dressing care:  -Wash hands thoroughly with water and soap for at least 20 seconds.   -take off old dressing and clean around drain site with gauze soaked with warm water  -After cleaning the site, dry and replace dressing with a new gauze and tegaderm.   -Place one piece of gauze underneath the drain and another piece of gauze on top of drain.   -Apply tegaderm (clear dressing) on top.  -Change dressing every 3 days or when soiled

## 2024-05-09 NOTE — DISCHARGE NOTE NURSING/CASE MANAGEMENT/SOCIAL WORK - PATIENT PORTAL LINK FT
You can access the FollowMyHealth Patient Portal offered by [facility] by registering at the following website: [email or web address]. By joining [employer]’s FollowMyHealth portal, you will also be able to view your health information using other applications (apps) compatible with our system.

## 2024-05-09 NOTE — PROGRESS NOTE ADULT - SUBJECTIVE AND OBJECTIVE BOX
Interventional Radiology Follow-Up Note    This is a 48y Female s/p placement of left nephroureteral tube on 5/9 in Interventional Radiology with Dr. Ernst.     S: Patient seen and examined @ bedside. No complaints offered.     Medication:       Vitals:   T(F): 98.8, Max: 98.8 (12:28)  HR: 88  BP: 133/74  RR: 18  SpO2: 97%    Physical Exam:  General: Nontoxic, in NAD, A&O x3.  Abdomen: soft, NTND, no peritoneal signs.  Drain Device: Drain intact attached to gravity bag with pink tinged urine draining. Drain flushed w 5cc NS w/o difficulty. No pericatheter leakage noted, +fluid return noted in tubing. Dressings clean, dry, intact.     24hr Drain output: 450ml    LABS:      Assessment/Plan:  48y Female admitted with Calculus of kidney      Pt most recently s/p placement of left nephroureteral tube on 5/9 in Interventional Radiology with Dr. Ernst.     - continue global management per primary team  - monitor h/h; transfuse as needed  - trend vs/ labs  - flush drain with 10cc NS daily forward only; DO NOT aspirate  - change dressing q3 days or when dressing is saturated  - regarding outpatient follow up with IR, if the patient is d/c home with drainage catheter they can make an appointment with IR by calling the IR booking office at [phone]; recommend IR follow in 3 months for tube evaluation.  - Greater than 50% of the encounter was spent counseling and/or coordination of care on drain management and follow up.   -they will benefit from VNS service to help with drainage catheter care; they should continue same drainage catheter care as an outpatient.  - No objection to discharge from IR standpoint  - Patient to follow up with Dr. Cazares for additional urological procedure on Monday 5/13     --  Farhad Perales NP  Interventional Radiology  Available on [employer] TEAMS / [id number]    For EMERGENT inquiries/questions:  IR Pager ([facility]): [phone]    For non-emergent consults/questions:   Please place a sunrise order "Consult- Interventional Radiology" with an appropriate callback number    For questions about scheduling during appropriate work hours, call IR :  [facility]: [phone]    For outpatient IR booking:  [facility]: [phone]    
Patient is a 48y old  Female who presents with a chief complaint of Monitoring overnight after IR (09 May 2024 03:58)      SUBJECTIVE / OVERNIGHT EVENTS: Patient seen and examined at bedside. No acute events overnight. Feels lightheaded, but improving. BP also improving. Hematuria noted in nephrostomy improving. Hg pre-op 13, doubt significant drop which would warrant intervention. No hematuria when urinating. Minimal pain.     MEDICATIONS  (STANDING):  influenza   Vaccine 0.5 milliLiter(s) IntraMuscular once    MEDICATIONS  (PRN):  acetaminophen     Tablet .. 650 milliGRAM(s) Oral every 6 hours PRN Temp greater or equal to 38C (100.4F), Mild Pain (1 - 3)  HYDROmorphone  Injectable 0.25 milliGRAM(s) IV Push every 10 minutes PRN Severe Pain (7 - 10)  ondansetron Injectable 4 milliGRAM(s) IV Push once PRN Nausea and/or Vomiting  oxyCODONE    IR 5 milliGRAM(s) Oral once PRN Moderate Pain (4 - 6)      CAPILLARY BLOOD GLUCOSE        I&O's Summary    08 May 2024 07:01  -  09 May 2024 07:00  --------------------------------------------------------  IN: 240 mL / OUT: 375 mL / NET: -135 mL        PHYSICAL EXAM:  Vital Signs Last 24 Hrs  T(C): 36.9 (09 May 2024 05:12), Max: 37.2 (08 May 2024 09:10)  T(F): 98.5 (09 May 2024 05:12), Max: 98.9 (08 May 2024 09:10)  HR: 67 (09 May 2024 05:12) (56 - 79)  BP: 113/63 (09 May 2024 05:12) (101/67 - 125/73)  BP(mean): 82 (08 May 2024 14:20) (82 - 85)  RR: 17 (09 May 2024 05:12) (10 - 18)  SpO2: 95% (09 May 2024 05:12) (95% - 99%)    Parameters below as of 09 May 2024 05:12  Patient On (Oxygen Delivery Method): room air        GEN: female in NAD, appears comfortable, no diaphoresis  EYES: No scleral injection, EOMI  ENTM: neck supple & symmetric without tracheal deviation, moist membranes, no gross hearing impairment, thyroid gland not enlarged  CV: +S1/S2, no m/r/g, no abdominal bruit, no LE edema  RESP: breathing comfortably, no respiratory accessory muscle use, CTAB, no w/r/r  GI: normoactive BS, soft, NTND, no rebounding/guarding, no palpable masses    LABS:          PT/INR - ( 08 May 2024 12:28 )   PT: 11.0 sec;   INR: 1.05 ratio         PTT - ( 08 May 2024 12:28 )  PTT:38.2 sec            RADIOLOGY & ADDITIONAL TESTS:  Results Reviewed:   Imaging Personally Reviewed:  Electrocardiogram Personally Reviewed:    COORDINATION OF CARE:  Care Discussed with Consultants/Other Providers [Y/N]:  Prior or Outpatient Records Reviewed [Y/N]:

## 2024-05-12 ENCOUNTER — TRANSCRIPTION ENCOUNTER (OUTPATIENT)
Age: 49
End: 2024-05-12

## 2024-05-13 ENCOUNTER — OUTPATIENT (OUTPATIENT)
Dept: OUTPATIENT SERVICES | Facility: HOSPITAL | Age: 49
LOS: 1 days | End: 2024-05-13
Payer: COMMERCIAL

## 2024-05-13 ENCOUNTER — TRANSCRIPTION ENCOUNTER (OUTPATIENT)
Age: 49
End: 2024-05-13

## 2024-05-13 VITALS
TEMPERATURE: 99 F | DIASTOLIC BLOOD PRESSURE: 78 MMHG | HEIGHT: 69 IN | OXYGEN SATURATION: 99 % | HEART RATE: 70 BPM | RESPIRATION RATE: 18 BRPM | SYSTOLIC BLOOD PRESSURE: 119 MMHG | WEIGHT: 229.28 LBS

## 2024-05-13 VITALS
HEART RATE: 75 BPM | RESPIRATION RATE: 20 BRPM | SYSTOLIC BLOOD PRESSURE: 132 MMHG | DIASTOLIC BLOOD PRESSURE: 74 MMHG | OXYGEN SATURATION: 98 %

## 2024-05-13 DIAGNOSIS — N20.1 CALCULUS OF URETER: ICD-10-CM

## 2024-05-13 LAB — HCG UR QL: NEGATIVE — SIGNIFICANT CHANGE UP

## 2024-05-13 PROCEDURE — C2625: CPT

## 2024-05-13 PROCEDURE — C1769: CPT

## 2024-05-13 PROCEDURE — 86901 BLOOD TYPING SEROLOGIC RH(D): CPT

## 2024-05-13 PROCEDURE — C1887: CPT

## 2024-05-13 PROCEDURE — C1894: CPT

## 2024-05-13 PROCEDURE — 52332 CYSTOSCOPY AND TREATMENT: CPT | Mod: LT

## 2024-05-13 PROCEDURE — 86850 RBC ANTIBODY SCREEN: CPT

## 2024-05-13 PROCEDURE — 81025 URINE PREGNANCY TEST: CPT

## 2024-05-13 PROCEDURE — 76000 FLUOROSCOPY <1 HR PHYS/QHP: CPT

## 2024-05-13 PROCEDURE — 50433 PLMT NEPHROURETERAL CATHETER: CPT

## 2024-05-13 PROCEDURE — 52320 CYSTOSCOPY AND TREATMENT: CPT | Mod: LT

## 2024-05-13 PROCEDURE — C1889: CPT

## 2024-05-13 PROCEDURE — 82365 CALCULUS SPECTROSCOPY: CPT

## 2024-05-13 PROCEDURE — 85730 THROMBOPLASTIN TIME PARTIAL: CPT

## 2024-05-13 PROCEDURE — 52300 CYSTOSCOPY AND TREATMENT: CPT

## 2024-05-13 PROCEDURE — 88300 SURGICAL PATH GROSS: CPT

## 2024-05-13 PROCEDURE — 52354 CYSTOURETERO W/BIOPSY: CPT

## 2024-05-13 PROCEDURE — G0378: CPT

## 2024-05-13 PROCEDURE — 85610 PROTHROMBIN TIME: CPT

## 2024-05-13 PROCEDURE — 88305 TISSUE EXAM BY PATHOLOGIST: CPT | Mod: 26

## 2024-05-13 PROCEDURE — C1758: CPT

## 2024-05-13 PROCEDURE — 88300 SURGICAL PATH GROSS: CPT | Mod: 26,59

## 2024-05-13 PROCEDURE — 88305 TISSUE EXAM BY PATHOLOGIST: CPT

## 2024-05-13 PROCEDURE — C2617: CPT

## 2024-05-13 PROCEDURE — 86900 BLOOD TYPING SEROLOGIC ABO: CPT

## 2024-05-13 DEVICE — URETERAL CATH OPEN END 5FR 70CM: Type: IMPLANTABLE DEVICE | Site: LEFT | Status: FUNCTIONAL

## 2024-05-13 DEVICE — STONE BASKET ZEROTIP NITINOL 4-WIRE 1.9FR 120CM X 12MM: Type: IMPLANTABLE DEVICE | Site: LEFT | Status: FUNCTIONAL

## 2024-05-13 DEVICE — LASER FIBER SOLTIVE 200: Type: IMPLANTABLE DEVICE | Site: LEFT | Status: FUNCTIONAL

## 2024-05-13 DEVICE — GUIDEWIRE AMPLATZ SUPER-STIFF STRAIGHT .035" X 145CM: Type: IMPLANTABLE DEVICE | Site: LEFT | Status: FUNCTIONAL

## 2024-05-13 DEVICE — GUIDEWIRE SENSOR DUAL-FLEX NITINOL STRAIGHT .035" X 150CM: Type: IMPLANTABLE DEVICE | Site: LEFT | Status: FUNCTIONAL

## 2024-05-13 DEVICE — LASER FIBER SOLTIVE 365: Type: IMPLANTABLE DEVICE | Site: LEFT | Status: FUNCTIONAL

## 2024-05-13 DEVICE — CATH STEERABLE IRR ASPIRATION 70CM: Type: IMPLANTABLE DEVICE | Site: LEFT | Status: FUNCTIONAL

## 2024-05-13 DEVICE — STENT URET INLAY 6FRX26CM 1USE W/O GD WIRE: Type: IMPLANTABLE DEVICE | Site: LEFT | Status: FUNCTIONAL

## 2024-05-13 DEVICE — IMPLANTABLE DEVICE: Type: IMPLANTABLE DEVICE | Site: LEFT | Status: FUNCTIONAL

## 2024-05-13 RX ORDER — SODIUM CHLORIDE 9 MG/ML
3 INJECTION INTRAMUSCULAR; INTRAVENOUS; SUBCUTANEOUS EVERY 8 HOURS
Refills: 0 | Status: DISCONTINUED | OUTPATIENT
Start: 2024-05-13 | End: 2024-05-13

## 2024-05-13 RX ORDER — ONDANSETRON 8 MG/1
4 TABLET, FILM COATED ORAL EVERY 6 HOURS
Refills: 0 | Status: DISCONTINUED | OUTPATIENT
Start: 2024-05-13 | End: 2024-05-13

## 2024-05-13 RX ORDER — HYDROMORPHONE HYDROCHLORIDE 2 MG/ML
0.5 INJECTION INTRAMUSCULAR; INTRAVENOUS; SUBCUTANEOUS
Refills: 0 | Status: DISCONTINUED | OUTPATIENT
Start: 2024-05-13 | End: 2024-05-13

## 2024-05-13 RX ORDER — CEFDINIR 250 MG/5ML
1 POWDER, FOR SUSPENSION ORAL
Qty: 10 | Refills: 0
Start: 2024-05-13 | End: 2024-05-17

## 2024-05-13 RX ORDER — LIDOCAINE HCL 20 MG/ML
0.2 VIAL (ML) INJECTION ONCE
Refills: 0 | Status: DISCONTINUED | OUTPATIENT
Start: 2024-05-13 | End: 2024-05-13

## 2024-05-13 RX ORDER — CEFAZOLIN SODIUM 1 G
2000 VIAL (EA) INJECTION ONCE
Refills: 0 | Status: COMPLETED | OUTPATIENT
Start: 2024-05-13 | End: 2024-05-13

## 2024-05-13 RX ORDER — FENTANYL CITRATE 50 UG/ML
25 INJECTION INTRAVENOUS
Refills: 0 | Status: DISCONTINUED | OUTPATIENT
Start: 2024-05-13 | End: 2024-05-13

## 2024-05-13 NOTE — BRIEF OPERATIVE NOTE - OPERATION/FINDINGS
Cysto, L URS, TUR of L ureterocele, [id number] stent placement in upper pole moiety, removal of lower pole moiety stent.

## 2024-05-13 NOTE — PRE-ANESTHESIA EVALUATION ADULT - NSANTHPMHFT_GEN_ALL_CORE
Medical history and ROS reviewed  ET > 4 METS, no CHF symptoms  No pertinent history other than nephrolithiasis

## 2024-05-13 NOTE — ASU PATIENT PROFILE, ADULT - FALL HARM RISK - UNIVERSAL INTERVENTIONS
Bed in lowest position, wheels locked, appropriate side rails in place/Call bell, personal items and telephone in reach/Instruct patient to call for assistance before getting out of bed or chair/Non-slip footwear when patient is out of bed/[place] to call system/Physically safe environment - no spills, clutter or unnecessary equipment/Purposeful Proactive Rounding/Room/bathroom lighting operational, light cord in reach

## 2024-05-13 NOTE — ASU DISCHARGE PLAN (ADULT/PEDIATRIC) - ASU DC SPECIAL INSTRUCTIONSFT
STENT: You may have an internal stent (a hollow tube that runs from the kidney to your bladder) after your procedure, which helps urine drain from the kidney to your bladder. Some patients experience urinary frequency, burning, or even back pain (especially with urination). These sensations will gradually get better. Increasing your fluid intake can also improve these symptoms. While the stent is in place, your urine may continue to be bloody. This stent is temporary and must be removed by your urologist as an outpatient with in 3 months unless otherwise specified.   GENERAL: It is common to have blood in your urine after your procedure. It may be pink or even red; inform your doctor if you have a significant amount of clot in the urine or if you are unable to void at all. The urine may clear and then become bloody again especially as you are more physically active.  BATHING: You may shower or bathe.  DIET: You may resume your regular diet and regular medication regimen.  PAIN: You may take Tylenol (acetaminophen) 650-975mg and/or Motrin (ibuprofen) 400-600mg, both available over the counter, for pain every 6 hours as needed. Do not exceed 4000mg of Tylenol (acetaminophen) daily. You may alternate these medications such that you take one or the other every 3 hours for around the clock pain coverage.   ANTIBIOTICS: You may be given a prescription for an antibiotic, please take this medication as instructed and be sure to complete the entire course.  STOOL SOFTENERS: Do not allow yourself to become constipated as straining may cause bleeding. Take stool softeners or a laxative (ex. Miralax, Colace, Senokot, ExLax, etc), available over the counter, if needed.  ACTIVITY: No heavy lifting or strenuous exercise until you are evaluated at your post-operative appointment. Otherwise, you may return to your usual level of physical activity.  FOLLOW-UP: If you did not already schedule your post-operative appointment, please call your urologist to schedule a follow-up appointment.  CALL YOUR UROLOGIST IF: You have any bleeding that does not stop, inability to void >8 hours, fever over 100.4 F, chills, persistent nausea/vomiting, changes in your incision concerning for infection, or if your pain is not controlled on your discharge pain medications.

## 2024-05-13 NOTE — ASU PATIENT PROFILE, ADULT - NSICDXPASTSURGICALHX_GEN_ALL_CORE_FT
PAST SURGICAL HISTORY:  History of  section twin pregnancy fetal demise 25 weeks gestation    History of miscarriage   [facility]

## 2024-05-13 NOTE — ASU PREOP CHECKLIST - ALLERGIES REVIEWED
Detail Level: Simple Instructions: This plan will send the code FBSD to the PM system.  DO NOT or CHANGE the price. Price (Do Not Change): 0.00 done

## 2024-05-17 LAB — SURGICAL PATHOLOGY STUDY: SIGNIFICANT CHANGE UP

## 2024-05-22 LAB
CELL MATERIAL STONE EST-MCNT: SIGNIFICANT CHANGE UP
CELL MATERIAL STONE EST-MCNT: SIGNIFICANT CHANGE UP
LABORATORY COMMENT REPORT: SIGNIFICANT CHANGE UP
LABORATORY COMMENT REPORT: SIGNIFICANT CHANGE UP
NIDUS STONE QN: SIGNIFICANT CHANGE UP
NIDUS STONE QN: SIGNIFICANT CHANGE UP

## 2024-10-03 ENCOUNTER — APPOINTMENT (OUTPATIENT)
Dept: ORTHOPEDIC SURGERY | Facility: CLINIC | Age: 49
End: 2024-10-03
Payer: COMMERCIAL

## 2024-10-03 VITALS — BODY MASS INDEX: 34.8 KG/M2 | HEIGHT: 69 IN | WEIGHT: 235 LBS

## 2024-10-03 DIAGNOSIS — M51.362: ICD-10-CM

## 2024-10-03 DIAGNOSIS — Z78.9 OTHER SPECIFIED HEALTH STATUS: ICD-10-CM

## 2024-10-03 DIAGNOSIS — M70.62 TROCHANTERIC BURSITIS, RIGHT HIP: ICD-10-CM

## 2024-10-03 DIAGNOSIS — M70.61 TROCHANTERIC BURSITIS, RIGHT HIP: ICD-10-CM

## 2024-10-03 DIAGNOSIS — M54.16 RADICULOPATHY, LUMBAR REGION: ICD-10-CM

## 2024-10-03 PROCEDURE — 72100 X-RAY EXAM L-S SPINE 2/3 VWS: CPT

## 2024-10-03 PROCEDURE — 99203 OFFICE O/P NEW LOW 30 MIN: CPT

## 2024-10-03 PROCEDURE — 73502 X-RAY EXAM HIP UNI 2-3 VIEWS: CPT

## 2024-10-03 NOTE — ASSESSMENT
[FreeTextEntry1] : I don't see anything wrong from a structural standpoint. May be related to the low D levels Back stretching discussed

## 2024-10-03 NOTE — HISTORY OF PRESENT ILLNESS
[Dull/Aching] : dull/aching [Localized] : localized [Tightness] : tightness [Constant] : constant [de-identified] : Has some pains hips/legs past few months. No injury. Gets back soreness but nothing severe. Has h/o low Vitamin D- now supplementing.  [] : no [FreeTextEntry1] : lower back, b/l hips [FreeTextEntry5] : lower back, b/l hips pain developed 5 months ago. No specific injury. States that she suffered from kidney stones

## 2025-05-05 ENCOUNTER — EMERGENCY (EMERGENCY)
Facility: HOSPITAL | Age: 50
LOS: 1 days | End: 2025-05-05
Attending: EMERGENCY MEDICINE
Payer: COMMERCIAL

## 2025-05-05 VITALS
DIASTOLIC BLOOD PRESSURE: 70 MMHG | TEMPERATURE: 98 F | RESPIRATION RATE: 18 BRPM | OXYGEN SATURATION: 96 % | SYSTOLIC BLOOD PRESSURE: 114 MMHG | HEART RATE: 74 BPM

## 2025-05-05 VITALS
HEIGHT: 69 IN | RESPIRATION RATE: 18 BRPM | HEART RATE: 84 BPM | OXYGEN SATURATION: 97 % | WEIGHT: 240.08 LBS | SYSTOLIC BLOOD PRESSURE: 143 MMHG | TEMPERATURE: 98 F | DIASTOLIC BLOOD PRESSURE: 85 MMHG

## 2025-05-05 LAB
ALBUMIN SERPL ELPH-MCNC: 4.4 G/DL — SIGNIFICANT CHANGE UP (ref 3.3–5)
ALP SERPL-CCNC: 88 U/L — SIGNIFICANT CHANGE UP (ref 40–120)
ALT FLD-CCNC: 25 U/L — SIGNIFICANT CHANGE UP (ref 10–45)
ANION GAP SERPL CALC-SCNC: 13 MMOL/L — SIGNIFICANT CHANGE UP (ref 5–17)
APTT BLD: 39.6 SEC — HIGH (ref 26.1–36.8)
AST SERPL-CCNC: 19 U/L — SIGNIFICANT CHANGE UP (ref 10–40)
BASOPHILS # BLD AUTO: 0.07 K/UL — SIGNIFICANT CHANGE UP (ref 0–0.2)
BASOPHILS NFR BLD AUTO: 0.6 % — SIGNIFICANT CHANGE UP (ref 0–2)
BILIRUB SERPL-MCNC: 0.2 MG/DL — SIGNIFICANT CHANGE UP (ref 0.2–1.2)
BUN SERPL-MCNC: 17 MG/DL — SIGNIFICANT CHANGE UP (ref 7–23)
CALCIUM SERPL-MCNC: 9.1 MG/DL — SIGNIFICANT CHANGE UP (ref 8.4–10.5)
CHLORIDE SERPL-SCNC: 102 MMOL/L — SIGNIFICANT CHANGE UP (ref 96–108)
CO2 SERPL-SCNC: 22 MMOL/L — SIGNIFICANT CHANGE UP (ref 22–31)
CREAT SERPL-MCNC: 0.87 MG/DL — SIGNIFICANT CHANGE UP (ref 0.5–1.3)
EGFR: 82 ML/MIN/1.73M2 — SIGNIFICANT CHANGE UP
EGFR: 82 ML/MIN/1.73M2 — SIGNIFICANT CHANGE UP
EOSINOPHIL # BLD AUTO: 0.13 K/UL — SIGNIFICANT CHANGE UP (ref 0–0.5)
EOSINOPHIL NFR BLD AUTO: 1.1 % — SIGNIFICANT CHANGE UP (ref 0–6)
GLUCOSE SERPL-MCNC: 79 MG/DL — SIGNIFICANT CHANGE UP (ref 70–99)
HCG SERPL-ACNC: <2 MIU/ML — SIGNIFICANT CHANGE UP
HCT VFR BLD CALC: 41.5 % — SIGNIFICANT CHANGE UP (ref 34.5–45)
HGB BLD-MCNC: 13.7 G/DL — SIGNIFICANT CHANGE UP (ref 11.5–15.5)
IMM GRANULOCYTES NFR BLD AUTO: 0.3 % — SIGNIFICANT CHANGE UP (ref 0–0.9)
INR BLD: 0.97 RATIO — SIGNIFICANT CHANGE UP (ref 0.85–1.16)
LYMPHOCYTES # BLD AUTO: 27.8 % — SIGNIFICANT CHANGE UP (ref 13–44)
LYMPHOCYTES # BLD AUTO: 3.3 K/UL — SIGNIFICANT CHANGE UP (ref 1–3.3)
MCHC RBC-ENTMCNC: 30.2 PG — SIGNIFICANT CHANGE UP (ref 27–34)
MCHC RBC-ENTMCNC: 33 G/DL — SIGNIFICANT CHANGE UP (ref 32–36)
MCV RBC AUTO: 91.6 FL — SIGNIFICANT CHANGE UP (ref 80–100)
MONOCYTES # BLD AUTO: 1.03 K/UL — HIGH (ref 0–0.9)
MONOCYTES NFR BLD AUTO: 8.7 % — SIGNIFICANT CHANGE UP (ref 2–14)
NEUTROPHILS # BLD AUTO: 7.3 K/UL — SIGNIFICANT CHANGE UP (ref 1.8–7.4)
NEUTROPHILS NFR BLD AUTO: 61.5 % — SIGNIFICANT CHANGE UP (ref 43–77)
NRBC BLD AUTO-RTO: 0 /100 WBCS — SIGNIFICANT CHANGE UP (ref 0–0)
NT-PROBNP SERPL-SCNC: <36 PG/ML — SIGNIFICANT CHANGE UP (ref 0–300)
PLATELET # BLD AUTO: 236 K/UL — SIGNIFICANT CHANGE UP (ref 150–400)
POTASSIUM SERPL-MCNC: 3.7 MMOL/L — SIGNIFICANT CHANGE UP (ref 3.5–5.3)
POTASSIUM SERPL-SCNC: 3.7 MMOL/L — SIGNIFICANT CHANGE UP (ref 3.5–5.3)
PROT SERPL-MCNC: 7.7 G/DL — SIGNIFICANT CHANGE UP (ref 6–8.3)
PROTHROM AB SERPL-ACNC: 11.2 SEC — SIGNIFICANT CHANGE UP (ref 9.9–13.4)
RBC # BLD: 4.53 M/UL — SIGNIFICANT CHANGE UP (ref 3.8–5.2)
RBC # FLD: 12.8 % — SIGNIFICANT CHANGE UP (ref 10.3–14.5)
SODIUM SERPL-SCNC: 137 MMOL/L — SIGNIFICANT CHANGE UP (ref 135–145)
TROPONIN T, HIGH SENSITIVITY RESULT: <6 NG/L — SIGNIFICANT CHANGE UP (ref 0–51)
WBC # BLD: 11.87 K/UL — HIGH (ref 3.8–10.5)
WBC # FLD AUTO: 11.87 K/UL — HIGH (ref 3.8–10.5)

## 2025-05-05 PROCEDURE — 84484 ASSAY OF TROPONIN QUANT: CPT

## 2025-05-05 PROCEDURE — 85025 COMPLETE CBC W/AUTO DIFF WBC: CPT

## 2025-05-05 PROCEDURE — 85610 PROTHROMBIN TIME: CPT

## 2025-05-05 PROCEDURE — 83880 ASSAY OF NATRIURETIC PEPTIDE: CPT

## 2025-05-05 PROCEDURE — 80053 COMPREHEN METABOLIC PANEL: CPT

## 2025-05-05 PROCEDURE — 71046 X-RAY EXAM CHEST 2 VIEWS: CPT | Mod: 26

## 2025-05-05 PROCEDURE — 84702 CHORIONIC GONADOTROPIN TEST: CPT

## 2025-05-05 PROCEDURE — 71046 X-RAY EXAM CHEST 2 VIEWS: CPT

## 2025-05-05 PROCEDURE — 93005 ELECTROCARDIOGRAM TRACING: CPT

## 2025-05-05 PROCEDURE — 99285 EMERGENCY DEPT VISIT HI MDM: CPT

## 2025-05-05 PROCEDURE — 93010 ELECTROCARDIOGRAM REPORT: CPT

## 2025-05-05 PROCEDURE — 85730 THROMBOPLASTIN TIME PARTIAL: CPT

## 2025-05-05 PROCEDURE — 99285 EMERGENCY DEPT VISIT HI MDM: CPT | Mod: 25

## 2025-05-05 NOTE — ED PROVIDER NOTE - PATIENT PORTAL LINK FT
You can access the FollowMyHealth Patient Portal offered by [facility] by registering at the following website: [email or web address]. By joining [employer]’s FollowMyHealth portal, you will also be able to view your health information using other applications (apps) compatible with our system.

## 2025-05-05 NOTE — ED ADULT TRIAGE NOTE - CHIEF COMPLAINT QUOTE
chest discomfort/pain began last week; saw pmd today,, Dr Vasu Duarte, sent in due to ekg changes and elevated bp

## 2025-05-05 NOTE — ED PROVIDER NOTE - NSFOLLOWUPINSTRUCTIONS_ED_ALL_ED_FT
Thank you for letting us treat you today. You were seen at [facility] for evaluation of your symptoms. Please follow up with your primary care doctor for reevaluation and further management. Also, please call the cardiology number above to establish care with cardiology.     Based on today's evaluation there is no evidence of any life or limb threatening condition that requires hospitalization, surgery or IV antibiotics. However please be aware that sometimes life-threatening conditions can present atypically at first. Therefore, I must ask if at any point after leaving you develop worsening symptoms, new or concerning symptoms, or feel as though you are failing to improve please return to the emergency department for further evaluation. Please return for evaluation in the ED for any new/worsening/concerning/persistent symptoms such as severe pain, high fevers, difficulty breathing, chest pain, confusion, profuse vomiting or diarrhea, or any other concerns or with your primary care doctor for routine medical management.     Please review your ER admission or any abnormalities in your  labs and/or imaging obtained today with your primary care physician as soon as possible:

## 2025-05-05 NOTE — ED PROVIDER NOTE - PROGRESS NOTE DETAILS
Patient reevaluated states she feels well no longer experiencing the chest pain she had prior.  Patient has remained hemodynamically stable.  CBC notable for mild leukocytosis to 11.7 in the setting of recent cough and negative chest x-ray likely viral URI.  CMP was unremarkable.  proBNP less than 36, troponin less than 6.  EKG negative for ischemic changes.  Discussed findings with patient and recommended referral to cardiology and follow-up with primary care doctor for further management.  Patient declining cardiology evaluation stating she feels fine.  Will give clinic number for her to make appointment if she chooses.

## 2025-05-05 NOTE — ED ADULT NURSE NOTE - OBJECTIVE STATEMENT
48 y/o female presents to the ED endorsing intermittent L-sided chest pain x1 week. A/Ox4. Ambulatory without assistive devices at baseline. PMH: Denies. Patient endorses that the pain began at rest. Patient denies any recent trauma or heavy lifting. Patient endorses the pain was worst on Friday and Saturday. Patient endorses seeing PCP on 5/5/25 where she was referred to the ED for ekg changes and elevated BP. Patient denies dyspnea, HA, nausea, vomiting, fever and chills. Patient on CM, NSR. Safety and comfort provided.

## 2025-05-05 NOTE — ED PROVIDER NOTE - NSFOLLOWUPCLINICS_GEN_ALL_ED_FT
Cardiology at [facility]  Cardiology  [address] [place] [address]  Phone: [phone]  Fax:   Follow Up Time: 7-10 Days

## 2025-05-05 NOTE — ED PROVIDER NOTE - CLINICAL SUMMARY MEDICAL DECISION MAKING FREE TEXT BOX
ALE aGrcia MD: Pt is a 49F with no sig PMH, takes no medications, non-smoker with no FHx MI who presents with L shoulder and upper chest pain. Reports pain began 1 week ago, is off/on, not worsened by exertion. Pt does not recall recent trauma. Pt saw her PCP today who reported EKG "change" and told her to go to ER. Pt she c/o joint aches/pains to other parts of her body which she attributes to age. Denies SOB, cough, n/v/d, focal numbness/weakness, dizziness, L arm pain/numbness. Pt's HEART score=0. No prior EKG on file. Today's 12-lead shows NSR, RBBB, signs of ischemia. Plan: basic labs, trop, cxr, reassess

## (undated) DEVICE — ADAPTER CHECK FLO 9FR STERILE

## (undated) DEVICE — DRAPE EQUIPMENT BANDED BAG 30 X 30" (SHOWER CAP)

## (undated) DEVICE — SYR LUER LOK 10CC

## (undated) DEVICE — FOLEY HOLDER STATLOCK 2 WAY ADULT

## (undated) DEVICE — PACK CYSTO

## (undated) DEVICE — PRESSURE INFUSOR BAG 3000ML

## (undated) DEVICE — SPECIMEN CONTAINER 4OZ

## (undated) DEVICE — PREP BETADINE KIT

## (undated) DEVICE — ACMI SELF-SEALING SEAL UP TO 7FR

## (undated) DEVICE — IRR BULB PATHFINDER + 10"

## (undated) DEVICE — POSITIONER FOAM HEADREST (PINK)

## (undated) DEVICE — TUBING RANGER FLUID IRRIGATION SET DISP

## (undated) DEVICE — DRAPE LINGEMAN TUR

## (undated) DEVICE — GLV 7 PROTEXIS (WHITE)

## (undated) DEVICE — TUBING SUCTION 20FT

## (undated) DEVICE — SOL IRR POUR H2O 1500ML

## (undated) DEVICE — SYR ASEPTO

## (undated) DEVICE — GOWN TRIMAX LG

## (undated) DEVICE — BOSTON SCIENTIFC PUMPING SYSTEM SAPS SINGLE ACTION 10CC

## (undated) DEVICE — GLV 7.5 PROTEXIS (WHITE)

## (undated) DEVICE — SOL IRR BAG NS 0.9% 3000ML

## (undated) DEVICE — POSITIONER FOAM EGG CRATE ULNAR 2PCS (PINK)

## (undated) DEVICE — TUBING THERMADX UROLOGY

## (undated) DEVICE — DRAPE TOWEL BLUE 17" X 24"

## (undated) DEVICE — WARMING BLANKET UPPER ADULT

## (undated) DEVICE — SOL IRR BAG H2O 3000ML

## (undated) DEVICE — VENODYNE/SCD SLEEVE CALF MEDIUM

## (undated) DEVICE — ELCTR PLASMA LOOP MEDIUM ANGLED 24FR 12-30 DEG

## (undated) DEVICE — CLAMP BX CUP 3FRX115CM OVL 85CM